# Patient Record
Sex: FEMALE | Race: WHITE | NOT HISPANIC OR LATINO | Employment: FULL TIME
[De-identification: names, ages, dates, MRNs, and addresses within clinical notes are randomized per-mention and may not be internally consistent; named-entity substitution may affect disease eponyms.]

---

## 2017-12-03 ENCOUNTER — HEALTH MAINTENANCE LETTER (OUTPATIENT)
Age: 57
End: 2017-12-03

## 2020-03-02 ENCOUNTER — HEALTH MAINTENANCE LETTER (OUTPATIENT)
Age: 60
End: 2020-03-02

## 2020-12-20 ENCOUNTER — HEALTH MAINTENANCE LETTER (OUTPATIENT)
Age: 60
End: 2020-12-20

## 2021-04-24 ENCOUNTER — HEALTH MAINTENANCE LETTER (OUTPATIENT)
Age: 61
End: 2021-04-24

## 2021-10-03 ENCOUNTER — HEALTH MAINTENANCE LETTER (OUTPATIENT)
Age: 61
End: 2021-10-03

## 2022-03-20 ENCOUNTER — HEALTH MAINTENANCE LETTER (OUTPATIENT)
Age: 62
End: 2022-03-20

## 2022-05-15 ENCOUNTER — HEALTH MAINTENANCE LETTER (OUTPATIENT)
Age: 62
End: 2022-05-15

## 2022-09-10 ENCOUNTER — HEALTH MAINTENANCE LETTER (OUTPATIENT)
Age: 62
End: 2022-09-10

## 2023-03-30 ENCOUNTER — HOSPITAL ENCOUNTER (EMERGENCY)
Facility: HOSPITAL | Age: 63
Discharge: HOME | End: 2023-03-30
Attending: EMERGENCY MEDICINE | Admitting: EMERGENCY MEDICINE
Payer: COMMERCIAL

## 2023-03-30 VITALS
OXYGEN SATURATION: 100 % | HEART RATE: 112 BPM | WEIGHT: 179.4 LBS | RESPIRATION RATE: 16 BRPM | DIASTOLIC BLOOD PRESSURE: 73 MMHG | BODY MASS INDEX: 31.78 KG/M2 | SYSTOLIC BLOOD PRESSURE: 173 MMHG | TEMPERATURE: 98.4 F

## 2023-03-30 DIAGNOSIS — I10 BENIGN ESSENTIAL HYPERTENSION: ICD-10-CM

## 2023-03-30 DIAGNOSIS — R73.9 HYPERGLYCEMIA: ICD-10-CM

## 2023-03-30 LAB
ALBUMIN UR-MCNC: NEGATIVE MG/DL
ANION GAP SERPL CALCULATED.3IONS-SCNC: 13 MMOL/L (ref 7–15)
APPEARANCE UR: CLEAR
BILIRUB UR QL STRIP: NEGATIVE
BUN SERPL-MCNC: 16.5 MG/DL (ref 8–23)
CALCIUM SERPL-MCNC: 9.7 MG/DL (ref 8.8–10.2)
CHLORIDE SERPL-SCNC: 106 MMOL/L (ref 98–107)
COLOR UR AUTO: YELLOW
CREAT SERPL-MCNC: 0.79 MG/DL (ref 0.51–0.95)
DEPRECATED HCO3 PLAS-SCNC: 20 MMOL/L (ref 22–29)
ERYTHROCYTE [DISTWIDTH] IN BLOOD BY AUTOMATED COUNT: 12.9 % (ref 10–15)
GFR SERPL CREATININE-BSD FRML MDRD: 85 ML/MIN/1.73M2
GLUCOSE SERPL-MCNC: 140 MG/DL (ref 70–99)
GLUCOSE UR STRIP-MCNC: NEGATIVE MG/DL
HCT VFR BLD AUTO: 40.7 % (ref 35–47)
HGB BLD-MCNC: 13.3 G/DL (ref 11.7–15.7)
HGB UR QL STRIP: NEGATIVE
KETONES UR STRIP-MCNC: NEGATIVE MG/DL
LEUKOCYTE ESTERASE UR QL STRIP: NEGATIVE
MAGNESIUM SERPL-MCNC: 1.7 MG/DL (ref 1.7–2.3)
MCH RBC QN AUTO: 27.8 PG (ref 26.5–33)
MCHC RBC AUTO-ENTMCNC: 32.7 G/DL (ref 31.5–36.5)
MCV RBC AUTO: 85 FL (ref 78–100)
MUCOUS THREADS #/AREA URNS LPF: PRESENT /LPF
NITRATE UR QL: NEGATIVE
PH UR STRIP: 6 [PH] (ref 5–7)
PLATELET # BLD AUTO: 330 10E3/UL (ref 150–450)
POTASSIUM SERPL-SCNC: 3.8 MMOL/L (ref 3.4–5.3)
RBC # BLD AUTO: 4.78 10E6/UL (ref 3.8–5.2)
RBC URINE: 1 /HPF
SODIUM SERPL-SCNC: 139 MMOL/L (ref 136–145)
SP GR UR STRIP: 1.02 (ref 1–1.03)
SQUAMOUS EPITHELIAL: <1 /HPF
UROBILINOGEN UR STRIP-MCNC: <2 MG/DL
WBC # BLD AUTO: 6.7 10E3/UL (ref 4–11)
WBC URINE: 5 /HPF

## 2023-03-30 PROCEDURE — 82435 ASSAY OF BLOOD CHLORIDE: CPT | Performed by: EMERGENCY MEDICINE

## 2023-03-30 PROCEDURE — 81001 URINALYSIS AUTO W/SCOPE: CPT | Performed by: STUDENT IN AN ORGANIZED HEALTH CARE EDUCATION/TRAINING PROGRAM

## 2023-03-30 PROCEDURE — 99283 EMERGENCY DEPT VISIT LOW MDM: CPT

## 2023-03-30 PROCEDURE — 36415 COLL VENOUS BLD VENIPUNCTURE: CPT | Performed by: EMERGENCY MEDICINE

## 2023-03-30 PROCEDURE — 85027 COMPLETE CBC AUTOMATED: CPT | Performed by: EMERGENCY MEDICINE

## 2023-03-30 PROCEDURE — 83735 ASSAY OF MAGNESIUM: CPT | Performed by: EMERGENCY MEDICINE

## 2023-03-30 ASSESSMENT — ACTIVITIES OF DAILY LIVING (ADL)
ADLS_ACUITY_SCORE: 35

## 2023-03-30 NOTE — DISCHARGE INSTRUCTIONS
Your blood pressure was slightly elevated today as well as your glucose.  You need to have these rechecked in the clinic setting and have formal glucose tolerance testing performed.  This will determine the need for potential medications for diabetes.  Your blood pressure was also moderately elevated today however this improved significantly without intervention.  Have this rechecked also as you may need blood pressure medications.

## 2023-03-30 NOTE — ED PROVIDER NOTES
EMERGENCY DEPARTMENT ENCOUNTER      NAME: Estee Lau  AGE: 63 year old female  YOB: 1960  MRN: [id number]  EVALUATION DATE & TIME: 3/30/2023  4:19 PM    PCP: No primary care provider on file.    ED PROVIDER: Pj Stroud M.D.      Chief Complaint   Patient presents with     Dizziness         FINAL IMPRESSION:  No diagnosis found.      ED COURSE & MEDICAL DECISION MAKING:    Pertinent Labs & Imaging studies reviewed. (See chart for details)  63 year old female presents to the Emergency Department for evaluation of dizziness.  Patient describes just sort of symptoms lightheadedness.  Patient recently started using CBD Gummies for left knee discomfort.  Been using perhaps 10 days.  After taking some today she started feeling lightheaded.  May have inadvertently taking more than typically.  Of note patient tachycardic with moderate hypertension on arrival.  Blood pressure more than 200 systolic.  Patient reports not having seen a physician for a few years.  No prior history of diabetes.  No obvious over-the-counter preparations which would relate to hypertension and tachycardia.  No excessive caffeine use.  No cold medications.  On exam she is well-nourished follow-up female in no distress.  Repeat blood pressure improved at 178 systolic.  However patient remains tachycardic.  Given her absence of significant medical care we will proceed with CBC and basic metabolic panel for potential contributory features to her tachycardia and hypertension.  No need for obvious interventions presently with her blood pressure moderating.  Patient appears non toxic with stable vitals signs. Overall exam is benign.      4:26 PM I met with the patient for the initial interview and physical examination. Discussed plan for treatment and workup in the ED.    5:34 PM.  Urine without evidence of infection.  Magnesium normal at 1.7.  Electrolytes essentially normal except for minimal hyperglycemia at 140.  CBC  unremarkable.  Patient likely with dizziness related to her CBD use.  Patient cautioned about continued use/overuse.  Also instructed seek follow-up for routine glucose tolerance testing given her elevated sugar and repeat blood pressure testing.  Patient understood this well.  At the conclusion of the encounter I discussed the results of all of the tests and the disposition. The questions were answered and return precautions provided. The patient or family acknowledged understanding and was agreeable with the care plan.     Medical Decision Making    History:    Supplemental history from: Documented in chart, if applicable    External Record(s) reviewed: Documented in chart, if applicable.    Work Up:    Chart documentation includes differential considered and any EKGs or imaging independently interpreted by provider, where specified.    In additional to work up documented, I considered the following work up: Documented in chart, if applicable.    External consultation:    Discussion of management with another provider: Documented in chart, if applicable    Complicating factors:    Care impacted by chronic illness: Hyperlipidemia    Care affected by social determinants of health: N/A    Disposition considerations: Discharge. No recommendations on prescription strength medication(s). See documentation for any additional details.      PPE: Provider wore gloves, N95 mask, eye protection, surgical cap, and paper mask.     MEDICATIONS GIVEN IN THE EMERGENCY:  Medications - No data to display    NEW PRESCRIPTIONS STARTED AT TODAY'S ER VISIT  New Prescriptions    No medications on file          =================================================================    HPI    Patient information was obtained from: patient     Use of Intrepreter: N/A          Estee Lau is a 63 year old female with a pertient medical history of HLD,  who presents to the ED for evaluation of dizziness    Patient has been taking two 5mg THC  gummies daily for the last 10 days. She cuts the gummy into smaller pieces and will eat them throughout the day to help with her knee. Today around 3pm, the patient reports feeling dizzy and reports she might have taken too many. She also notes she ate the gummy on an empty stomach. The patient denies any nausea, vomiting, diarrhea, or any other complaints at this time.     Patient notes she has not seen her PCP for a couple years. En triage, the patient was tachycardic and hypertensive. Not taking any blood pressure medications.     REVIEW OF SYSTEMS   Constitutional:  Denies fever, chills  Respiratory:  Denies productive cough or increased work of breathing  Cardiovascular:  Denies chest pain, palpitations  GI:  Denies abdominal pain, nausea, vomiting, or change in bowel or bladder habits   Musculoskeletal:  Denies any new muscle/joint swelling  Skin:  Denies rash   Neurologic:  Denies focal weakness. Positive for dizziness  All systems negative except as marked.     PAST MEDICAL HISTORY:  Past Medical History:   Diagnosis Date     Fibromyalgia      Hyperlipidemia        PAST SURGICAL HISTORY:  Past Surgical History:   Procedure Laterality Date     ENT SURGERY      tonsilectomy     GYN SURGERY  2000    Cervix and 1 ovary remaining     HYSTERECTOMY, PAP STILL INDICATED       ORTHOPEDIC SURGERY      right knee meniscus         CURRENT MEDICATIONS:    No current facility-administered medications for this encounter.    Current Outpatient Medications:      cholecalciferol (D-3-5) 5000 UNITS CAPS, , Disp: , Rfl:      cyclobenzaprine (FLEXERIL) 10 MG tablet, Take 0.5-1 tablets (5-10 mg) by mouth 3 times daily as needed for muscle spasms, Disp: 30 tablet, Rfl: 1     indomethacin (INDOCIN) 25 MG capsule, Take 25 mg by mouth as needed, Disp: , Rfl:      Misc Natural Products (OSTEO BI-FLEX ADV JOINT SHIELD PO), , Disp: , Rfl:      Omega-3 Fatty Acids (FISH OIL) 1200 MG CAPS, Take 2,400 mg by mouth, Disp: , Rfl:       TESTOSTERONE CYPIONATE IM, , Disp: , Rfl:      UNABLE TO FIND, MEDICATION NAME: Compounded estrogen and progesterone, Disp: , Rfl:     ALLERGIES:  Allergies   Allergen Reactions     Ofloxacin        FAMILY HISTORY:  Family History   Problem Relation Age of Onset     Hypertension Mother      C.A.D. Father 47        [place]- from this     C.A.D. Paternal Grandfather 42         from [place]       SOCIAL HISTORY:   Social History     Socioeconomic History     Marital status: Single   Tobacco Use     Smoking status: Never     Smokeless tobacco: Never   Substance and Sexual Activity     Alcohol use: Yes     Drug use: No     Sexual activity: Yes     Partners: Male   Other Topics Concern     Parent/sibling w/ CABG, MI or angioplasty before 65F 55M? Yes     Comment: Father  a 49 of [place]       VITALS:  Patient Vitals for the past 24 hrs:   BP Temp Temp src Pulse Resp SpO2 Weight   23 1614 (!) 219/87 98.4  F (36.9  C) Temporal (!) 126 16 100 % 81.4 kg (179 lb 6.4 oz)        PHYSICAL EXAM    Constitutional:  Awake, alert, in no apparent distress  HENT:  Normocephalic, Atraumatic. Bilateral external ears normal. Oropharynx moist. Nose normal. Neck- Normal range of motion with no guarding Supple, No stridor.   Eyes:  PERRL, EOMI with no signs of entrapment, Conjunctiva normal, No discharge.   Respiratory:  Normal breath sounds, No respiratory distress, No wheezing.    Cardiovascular:  Tachycardic, Normal rhythm, No appreciable rubs or gallops.   GI:  Soft, No tenderness, No distension, No palpable masses  Musculoskeletal: No edema. Good range of motion in all major joints. No tenderness to palpation or major deformities noted.  Integument:  Warm, Dry, No erythema, No rash.   Neurologic:  Alert & oriented, Normal motor function, Normal sensory function, No focal deficits noted.   Psychiatric:  Affect normal, Judgment normal, Mood normal.     LAB:  All pertinent labs reviewed and interpreted.      Results for orders placed or  performed during the hospital encounter of 03/30/23   Basic metabolic panel     Status: Abnormal   Result Value Ref Range    Sodium 139 136 - 145 mmol/L    Potassium 3.8 3.4 - 5.3 mmol/L    Chloride 106 98 - 107 mmol/L    Carbon Dioxide (CO2) 20 (L) 22 - 29 mmol/L    Anion Gap 13 7 - 15 mmol/L    Urea Nitrogen 16.5 8.0 - 23.0 mg/dL    Creatinine 0.79 0.51 - 0.95 mg/dL    Calcium 9.7 8.8 - 10.2 mg/dL    Glucose 140 (H) 70 - 99 mg/dL    GFR Estimate 85 >60 mL/min/1.73m2   CBC (+ platelets, no diff)     Status: Normal   Result Value Ref Range    WBC Count 6.7 4.0 - 11.0 10e3/uL    RBC Count 4.78 3.80 - 5.20 10e6/uL    Hemoglobin 13.3 11.7 - 15.7 g/dL    Hematocrit 40.7 35.0 - 47.0 %    MCV 85 78 - 100 fL    MCH 27.8 26.5 - 33.0 pg    MCHC 32.7 31.5 - 36.5 g/dL    RDW 12.9 10.0 - 15.0 %    Platelet Count 330 150 - 450 10e3/uL   Magnesium     Status: Normal   Result Value Ref Range    Magnesium 1.7 1.7 - 2.3 mg/dL   UA with Microscopic reflex to Culture     Status: Abnormal    Specimen: Urine, Clean Catch   Result Value Ref Range    Color Urine Yellow Colorless, Straw, Light Yellow, Yellow    Appearance Urine Clear Clear    Glucose Urine Negative Negative mg/dL    Bilirubin Urine Negative Negative    Ketones Urine Negative Negative mg/dL    Specific Gravity Urine 1.016 1.001 - 1.030    Blood Urine Negative Negative    pH Urine 6.0 5.0 - 7.0    Protein Albumin Urine Negative Negative mg/dL    Urobilinogen Urine <2.0 <2.0 mg/dL    Nitrite Urine Negative Negative    Leukocyte Esterase Urine Negative Negative    Mucus Urine Present (A) None Seen /LPF    RBC Urine 1 <=2 /HPF    WBC Urine 5 <=5 /HPF    Squamous Epithelials Urine <1 <=1 /HPF    Narrative    Urine Culture not indicated       RADIOLOGY:  Reviewed all pertinent imaging. Please see official radiology report.  No orders to display       Tawnya QUINTERO, am serving as a scribe to document services personally performed by Pj Stroud MD, based on my observation and  the provider's statements to me. I, Pj Stroud MD attest that Tawnya Boss is acting in a scribe capacity, has observed my performance of the services and has documented them in accordance with my direction.    Pj Stroud M.D.  Emergency Medicine  [facility] EMERGENCY DEPARTMENT     Pj Stroud MD  03/30/23 1734

## 2023-03-31 ASSESSMENT — ACTIVITIES OF DAILY LIVING (ADL): ADLS_ACUITY_SCORE: 35

## 2023-06-03 ENCOUNTER — HEALTH MAINTENANCE LETTER (OUTPATIENT)
Age: 63
End: 2023-06-03

## 2023-06-18 ENCOUNTER — HOSPITAL ENCOUNTER (EMERGENCY)
Facility: CLINIC | Age: 63
Discharge: HOME | End: 2023-06-18
Attending: EMERGENCY MEDICINE | Admitting: EMERGENCY MEDICINE
Payer: OTHER MISCELLANEOUS

## 2023-06-18 ENCOUNTER — APPOINTMENT (OUTPATIENT)
Dept: CT IMAGING | Facility: CLINIC | Age: 63
End: 2023-06-18
Attending: EMERGENCY MEDICINE
Payer: OTHER MISCELLANEOUS

## 2023-06-18 VITALS
SYSTOLIC BLOOD PRESSURE: 207 MMHG | HEART RATE: 82 BPM | OXYGEN SATURATION: 99 % | TEMPERATURE: 97.5 F | DIASTOLIC BLOOD PRESSURE: 112 MMHG | RESPIRATION RATE: 18 BRPM

## 2023-06-18 DIAGNOSIS — S09.90XA CLOSED HEAD INJURY, INITIAL ENCOUNTER: ICD-10-CM

## 2023-06-18 PROCEDURE — 99284 EMERGENCY DEPT VISIT MOD MDM: CPT | Mod: 25

## 2023-06-18 PROCEDURE — 70450 CT HEAD/BRAIN W/O DYE: CPT

## 2023-06-18 NOTE — Clinical Note
Estee Lau was seen and treated in our emergency department on 6/18/2023.  She may return to work on 06/20/2023.       If you have any questions or concerns, please don't hesitate to call.      Jassi Kong MD

## 2023-06-19 NOTE — ED PROVIDER NOTES
History     Chief Complaint:  Head Injury       HPI   Estee Lau is a 63 year old female who presents after getting hit on the top of the head at work today.  She was bending over with the dust pan when a coworker opened the door and hit her on the top of the head.  She did not fall.  She did not lose consciousness.  She is not on a blood thinner.  She has a very mild headache.  She has no extremity weakness.  No nausea or vomiting.  No other complaints at this time.      Independent Historian:    The patient    Review of External Notes:  None    Medications:    cholecalciferol (D-3-5) 5000 UNITS CAPS  cyclobenzaprine (FLEXERIL) 10 MG tablet  indomethacin (INDOCIN) 25 MG capsule  Misc Natural Products (OSTEO BI-FLEX ADV JOINT SHIELD PO)  Omega-3 Fatty Acids (FISH OIL) 1200 MG CAPS  TESTOSTERONE CYPIONATE IM  UNABLE TO FIND        Past Medical History:    Past Medical History:   Diagnosis Date     Fibromyalgia      Hyperlipidemia        Past Surgical History:    Past Surgical History:   Procedure Laterality Date     ENT SURGERY      tonsilectomy     GYN SURGERY  2000    Cervix and 1 ovary remaining     HYSTERECTOMY, PAP STILL INDICATED       ORTHOPEDIC SURGERY      right knee meniscus          Physical Exam     Patient Vitals for the past 24 hrs:   BP Temp Pulse Resp SpO2   06/18/23 2153 (!) 207/112 97.5  F (36.4  C) 82 18 99 %        Physical Exam  Constitutional: Vital signs reviewed as above  General: Alert, pleasant  HEENT: Moist mucous membranes.  No external signs of trauma.  No hematoma.  No laceration.  Eyes: Pupils are equal, round, and reactive to light.   Neck: Normal range of motion  Cardiovascular: normal rate, Regular rhythm and normal heart sounds.  Mo MRG  Pulmonary/Chest: Effort normal and breath sounds normal. No respiratory distress. Patient has no wheezes. Patient has no rales.   Musculoskeletal/Extremities: Full ROM.  Endo: No pitting edema  Neurological: A/O x 3. CN-II-XII intact  bilaterally. No pronator drift. Normal strength and sensation throughout all 4 extremities. GCS 15.  No midline neck tenderness.  Skin: Skin is warm and dry.   Psychiatric: Pleasant      Emergency Department Course       Imaging:  Head CT w/o contrast   Final Result   IMPRESSION:   1.  No CT finding of a mass, hemorrhage or focal area suggestive of acute infarct.        Report per radiology    Emergency Department Course & Assessments:    Independent Interpretation (X-rays, CTs, rhythm strip):  CT scan of the head was negative for any acute process per my interpretation.    Social Determinants of Health affecting care:  None     Disposition:  The patient was discharged to home.     Impression & Plan        Medical Decision Making:  Patient presents to the emergency department after sustaining a head injury as described above.  There is no abrasion or laceration.  There is no LOC.  There is no midline neck tenderness.  Her neurologic exam here is completely normal.  No nausea or vomiting.  She has a mild headache.  She was offered ibuprofen but states she will take it at home.  I did discuss that she likely falls somewhere in the concussion spectrum however this seems to be mild.  She will continue with sleep, avoidance of electronics as able, and plenty of fluids.  I did give her a work note for tomorrow.  Certainly return if symptoms warrant.    Diagnosis:    ICD-10-CM    1. Closed head injury, initial encounter  S09.90XA            Discharge Medications:  New Prescriptions    No medications on file        Jassi Kong MD  6/18/2023   Jassi Kong MD Walters, Brent Aaron, MD  06/18/23 2152

## 2023-06-19 NOTE — ED TRIAGE NOTES
Arrives from work. States she was sweeping at work, was leaving over and got hit in the head by a door opening. States neck is stiff. Denies LOC, denies blood thinners. Denies any neurological changed.

## 2024-04-28 ENCOUNTER — HEALTH MAINTENANCE LETTER (OUTPATIENT)
Age: 64
End: 2024-04-28

## 2024-07-07 ENCOUNTER — HEALTH MAINTENANCE LETTER (OUTPATIENT)
Age: 64
End: 2024-07-07

## 2025-07-13 ENCOUNTER — HEALTH MAINTENANCE LETTER (OUTPATIENT)
Age: 65
End: 2025-07-13